# Patient Record
Sex: MALE | Race: OTHER | Employment: OTHER | URBAN - METROPOLITAN AREA
[De-identification: names, ages, dates, MRNs, and addresses within clinical notes are randomized per-mention and may not be internally consistent; named-entity substitution may affect disease eponyms.]

---

## 2023-04-07 ENCOUNTER — TELEPHONE (OUTPATIENT)
Dept: INTERNAL MEDICINE CLINIC | Facility: CLINIC | Age: 68
End: 2023-04-07

## 2023-04-10 ENCOUNTER — OFFICE VISIT (OUTPATIENT)
Dept: INTERNAL MEDICINE CLINIC | Facility: CLINIC | Age: 68
End: 2023-04-10
Payer: MEDICARE

## 2023-04-10 VITALS
RESPIRATION RATE: 15 BRPM | SYSTOLIC BLOOD PRESSURE: 130 MMHG | DIASTOLIC BLOOD PRESSURE: 70 MMHG | HEART RATE: 75 BPM | TEMPERATURE: 98 F | WEIGHT: 175.63 LBS | HEIGHT: 66 IN | OXYGEN SATURATION: 98 % | BODY MASS INDEX: 28.22 KG/M2

## 2023-04-10 DIAGNOSIS — Z12.11 COLON CANCER SCREENING: ICD-10-CM

## 2023-04-10 DIAGNOSIS — E11.59 TYPE 2 DIABETES MELLITUS WITH OTHER CIRCULATORY COMPLICATION, UNSPECIFIED WHETHER LONG TERM INSULIN USE (HCC): Primary | ICD-10-CM

## 2023-04-10 DIAGNOSIS — E55.9 VITAMIN D DEFICIENCY: ICD-10-CM

## 2023-04-10 DIAGNOSIS — E78.5 HYPERLIPIDEMIA, UNSPECIFIED HYPERLIPIDEMIA TYPE: ICD-10-CM

## 2023-04-10 DIAGNOSIS — M19.049 HAND ARTHRITIS: ICD-10-CM

## 2023-04-10 DIAGNOSIS — I10 HYPERTENSION, UNSPECIFIED TYPE: ICD-10-CM

## 2023-04-10 DIAGNOSIS — Z01.89 ROUTINE LAB DRAW: ICD-10-CM

## 2023-04-10 DIAGNOSIS — I25.10 CORONARY ARTERY DISEASE INVOLVING NATIVE HEART WITHOUT ANGINA PECTORIS, UNSPECIFIED VESSEL OR LESION TYPE: ICD-10-CM

## 2023-04-10 DIAGNOSIS — G56.00 CARPAL TUNNEL SYNDROME, UNSPECIFIED LATERALITY: ICD-10-CM

## 2023-04-10 DIAGNOSIS — I25.10 CARDIOVASCULAR DISORDER: ICD-10-CM

## 2023-04-10 RX ORDER — ASPIRIN 81 MG/1
81 TABLET, CHEWABLE ORAL
COMMUNITY

## 2023-04-10 RX ORDER — FLASH GLUCOSE SENSOR
KIT MISCELLANEOUS
COMMUNITY
Start: 2022-12-23 | End: 2023-04-10

## 2023-04-10 RX ORDER — ATORVASTATIN CALCIUM 80 MG/1
TABLET, FILM COATED ORAL
COMMUNITY
Start: 2023-01-30

## 2023-04-10 RX ORDER — CARVEDILOL 6.25 MG/1
6.25 TABLET ORAL 2 TIMES DAILY WITH MEALS
COMMUNITY

## 2023-04-10 RX ORDER — INSULIN DETEMIR 100 [IU]/ML
INJECTION, SOLUTION SUBCUTANEOUS
COMMUNITY
Start: 2022-12-19

## 2023-04-10 RX ORDER — FUROSEMIDE 20 MG/1
20 TABLET ORAL DAILY
COMMUNITY

## 2023-04-10 RX ORDER — FLASH GLUCOSE SENSOR
KIT MISCELLANEOUS
Qty: 6 EACH | Refills: 5 | Status: SHIPPED | OUTPATIENT
Start: 2023-04-10

## 2023-04-10 RX ORDER — KETOROLAC TROMETHAMINE 5 MG/ML
1 SOLUTION OPHTHALMIC 3 TIMES DAILY
COMMUNITY
Start: 2022-12-13

## 2023-04-10 RX ORDER — LISINOPRIL 2.5 MG/1
TABLET ORAL
COMMUNITY
Start: 2023-03-09

## 2023-04-10 RX ORDER — INSULIN ASPART 100 [IU]/ML
INJECTION, SOLUTION INTRAVENOUS; SUBCUTANEOUS AS DIRECTED
COMMUNITY

## 2023-04-11 ENCOUNTER — TELEPHONE (OUTPATIENT)
Dept: INTERNAL MEDICINE CLINIC | Facility: CLINIC | Age: 68
End: 2023-04-11

## 2023-04-11 LAB
ALB/CREAT RATIO: 159 (ref 0–29)
ALBUMIN, URINE: 289.8
ALKALINE PHOSPHATASE: 91 (ref 44–121)
ALT: 15 (ref 0–44)
AST: 16 (ref 0–40)
BUN: 16 MG/DL (ref 8–27)
CARBON DIOXIDE: 30 (ref 20–29)
CHLORIDE: 101 (ref 96–106)
CHOLESTEROL, TOTAL: 130 MG/DL (ref 100–199)
CREATININE: 0.84 MG/DL (ref 0.76–1.27)
EGFR: 94
FRUCTOSAMINE: 346 UMOL/L (ref 0–285)
HDL CHOLESTEROL: 48 MG/DL
HEMOGLOBIN A1C: 11.2 % (ref 4.8–5.6)
HEMOGLOBIN: 13.8 G/DL (ref 13–17.7)
PLATELETS: 322 X10E3/UL (ref 150–450)
POTASSIUM: 4.1 (ref 3.5–5.2)
RBC: 4.61 (ref 4.14–5.8)
SODIUM: 143 (ref 134–144)
T4,FREE(DIRECT): 1.5 NG/DL (ref 0.82–1.77)
TRIGLYCERIDES: 164 MG/DL (ref 0–149)
TSH: 1.56 UIU/ML (ref 0.45–4.5)
URINE CREATININE: 182.2
URINE GLUCOSE: 250 MG/DL
VITAMIN D, 1, 25 DIHYD: 19.8 (ref 30–100)
WBC: 8.9 (ref 3.4–10.8)

## 2023-04-11 NOTE — TELEPHONE ENCOUNTER
Patient filled out medical record release for us to request his medical records from   [address], [address]  Ph: [phone]   Fax: [phone]    Faxed request, received confirmation.

## 2023-04-11 NOTE — TELEPHONE ENCOUNTER
Diabetic Eye Exam and entered patient brought us a copy when he came to his appointment out copies were sent to scan

## 2023-07-03 ENCOUNTER — TELEPHONE (OUTPATIENT)
Dept: INTERNAL MEDICINE CLINIC | Facility: CLINIC | Age: 68
End: 2023-07-03

## 2023-07-03 NOTE — TELEPHONE ENCOUNTER
Unable to reach patient for medication adherence consult via [facility]  ID# [id number]. LVM for patient to call me back.